# Patient Record
Sex: FEMALE | Race: WHITE | Employment: UNEMPLOYED | URBAN - METROPOLITAN AREA
[De-identification: names, ages, dates, MRNs, and addresses within clinical notes are randomized per-mention and may not be internally consistent; named-entity substitution may affect disease eponyms.]

---

## 2019-07-25 ENCOUNTER — APPOINTMENT (OUTPATIENT)
Dept: GENERAL RADIOLOGY | Facility: CLINIC | Age: 44
End: 2019-07-25

## 2019-07-25 ENCOUNTER — HOSPITAL ENCOUNTER (EMERGENCY)
Facility: CLINIC | Age: 44
Discharge: HOME | End: 2019-07-25
Attending: EMERGENCY MEDICINE | Admitting: EMERGENCY MEDICINE

## 2019-07-25 VITALS
RESPIRATION RATE: 16 BRPM | SYSTOLIC BLOOD PRESSURE: 96 MMHG | OXYGEN SATURATION: 100 % | HEIGHT: 67 IN | WEIGHT: 131.2 LBS | HEART RATE: 57 BPM | BODY MASS INDEX: 20.59 KG/M2 | TEMPERATURE: 98.3 F | DIASTOLIC BLOOD PRESSURE: 69 MMHG

## 2019-07-25 DIAGNOSIS — R04.2 HEMOPTYSIS: ICD-10-CM

## 2019-07-25 DIAGNOSIS — F17.210 CIGARETTE SMOKER: ICD-10-CM

## 2019-07-25 DIAGNOSIS — R05.9 COUGH: ICD-10-CM

## 2019-07-25 PROCEDURE — 99284 EMERGENCY DEPT VISIT MOD MDM: CPT | Mod: GC | Performed by: EMERGENCY MEDICINE

## 2019-07-25 PROCEDURE — 25000132 ZZH RX MED GY IP 250 OP 250 PS 637: Performed by: STUDENT IN AN ORGANIZED HEALTH CARE EDUCATION/TRAINING PROGRAM

## 2019-07-25 PROCEDURE — 71046 X-RAY EXAM CHEST 2 VIEWS: CPT

## 2019-07-25 PROCEDURE — 99283 EMERGENCY DEPT VISIT LOW MDM: CPT | Mod: 25

## 2019-07-25 RX ORDER — ALBUTEROL SULFATE 90 UG/1
2 AEROSOL, METERED RESPIRATORY (INHALATION) EVERY 4 HOURS PRN
Qty: 1 INHALER | Refills: 0 | Status: SHIPPED | OUTPATIENT
Start: 2019-07-25

## 2019-07-25 RX ORDER — IBUPROFEN 600 MG/1
600 TABLET, FILM COATED ORAL ONCE
Status: COMPLETED | OUTPATIENT
Start: 2019-07-25 | End: 2019-07-25

## 2019-07-25 RX ADMIN — IBUPROFEN 600 MG: 600 TABLET ORAL at 16:48

## 2019-07-25 ASSESSMENT — ENCOUNTER SYMPTOMS
ABDOMINAL PAIN: 0
FEVER: 0
VOMITING: 0
EYE REDNESS: 0
DIARRHEA: 0
DIZZINESS: 0
FREQUENCY: 0
COUGH: 1
BLOOD IN STOOL: 0
CHEST TIGHTNESS: 0
PHOTOPHOBIA: 0
NUMBNESS: 0
SLEEP DISTURBANCE: 0
STRIDOR: 0
HEMATOLOGIC/LYMPHATIC NEGATIVE: 1
SORE THROAT: 0
ENDOCRINE NEGATIVE: 1
FATIGUE: 0
SHORTNESS OF BREATH: 1
HEADACHES: 1
PALPITATIONS: 0
NAUSEA: 0
LIGHT-HEADEDNESS: 1
WEAKNESS: 0
RHINORRHEA: 0
CHILLS: 1
HEMATURIA: 0
WHEEZING: 1
CONSTIPATION: 0
EYE PAIN: 0
BACK PAIN: 0
DYSURIA: 0

## 2019-07-25 ASSESSMENT — MIFFLIN-ST. JEOR: SCORE: 1277.75

## 2019-07-25 NOTE — DISCHARGE INSTRUCTIONS
Please make an appointment to follow [facility] with [facility] (phone: [phone] or [facility] (phone: [phone]) in 3-7 days.    - Take inhaler as prescribed

## 2019-07-25 NOTE — ED AVS SNAPSHOT
[facility], Emergency Department  [address]  [facility] [address]  Phone:  [phone]  Fax:  [phone]                                    Viridiana Peralta   MRN: [id number]    Department:  [facility], Emergency Department   Date of Visit:  7/25/2019           After Visit Summary Signature Page    I have received my discharge instructions, and my questions have been answered. I have discussed any challenges I see with this plan with the nurse or doctor.    ..........................................................................................................................................  Patient/Patient Representative Signature      ..........................................................................................................................................  Patient Representative Print Name and Relationship to Patient    ..................................................               ................................................  Date                                   Time    ..........................................................................................................................................  Reviewed by Signature/Title    ...................................................              ..............................................  Date                                               Time          22EPIC Rev 08/18

## 2019-07-25 NOTE — ED PROVIDER NOTES
History     Chief Complaint   Patient presents with     Hemoptysis     Coughing up blood for the last two weeks. Sometimes sputum will not come up. Feels tightness in chest when laying down.      Eye Problem     Blurry vision for the last month.      Headache     Headache started about a month ago. Right sided back of headache now.      HPI  Viridiana Peralta is a 44 year old female who presents here with hemopytsis, eye problem, and headache.  Jennifer spitting up black stuff or black stuff, headache and vision problems.  Sometimes vision elke like it has a film on it or seeing starts, L eye more than R.     Coughing up stuff for 2-3 weeks.  Started off like regular sputum, green and black.  Started getting worse, started off pink and then got darker. Feels like something in the middle of chest and hard to breath when laying down. Coughs, but still feels like something there. No fevers, but chills. Nothing like this before.  No running nose, sneezing, or postnasal drip.  Smoker, currently 1/day for 10-15 years.  Wants to quick and will try anything.  Some shortness of breath, cant catch in. No trouble breathing. No PND.     Eye vision has been different last couple months. Mainly blurry over time. Blurry with things that are near and not far.  Sees floaters sometimes. No redness or painful.     Headache on R side goes front to back.  Gets headaches since head injuries in the past (last injury over 20 years ago).  Dull, comes and goes, 7/10.  Hasn't tried anything for it. Feels a little different from other headaches. Supposed to have head scans every 6 months to check for clots, but hasn't been. No recent head trauma. No changes in hearing.     Drinks alcohol now and then. Last drinl 3-4 ngihts ago.  Smokes marijuana, no iv drug use.     History reviewed. No pertinent past medical history.    Social History     Socioeconomic History     Marital status: Single     Spouse name: Not on file     Number of children: Not  on file     Years of education: Not on file     Highest education level: Not on file   Occupational History     Not on file   Social Needs     Financial resource strain: Not on file     Food insecurity:     Worry: Not on file     Inability: Not on file     Transportation needs:     Medical: Not on file     Non-medical: Not on file   Tobacco Use     Smoking status: Current Every Day Smoker     Packs/day: 0.50     Smokeless tobacco: Never Used   Substance and Sexual Activity     Alcohol use: Yes     Comment: Occssioanl      Drug use: Yes     Types: Marijuana     Sexual activity: Not Currently   Lifestyle     Physical activity:     Days per week: Not on file     Minutes per session: Not on file     Stress: Not on file   Relationships     Social connections:     Talks on phone: Not on file     Gets together: Not on file     Attends Spiritism service: Not on file     Active member of club or organization: Not on file     Attends meetings of clubs or organizations: Not on file     Relationship status: Not on file     Intimate partner violence:     Fear of current or ex partner: Not on file     Emotionally abused: Not on file     Physically abused: Not on file     Forced sexual activity: Not on file   Other Topics Concern     Not on file   Social History Narrative     Not on file       I have reviewed the Medications, Allergies, Past Medical and Surgical History, and Social History in the Epic system.    Review of Systems   Constitutional: Positive for chills. Negative for fatigue and fever.   HENT: Negative for congestion, drooling, hearing loss, postnasal drip, rhinorrhea and sore throat.    Eyes: Positive for visual disturbance. Negative for photophobia, pain and redness.   Respiratory: Positive for cough, shortness of breath and wheezing. Negative for chest tightness and stridor.    Cardiovascular: Negative for chest pain, palpitations and leg swelling.   Gastrointestinal: Negative for abdominal pain, blood in stool,  "constipation, diarrhea, nausea and vomiting.   Endocrine: Negative.    Genitourinary: Negative for dysuria, frequency, hematuria and urgency.   Musculoskeletal: Negative for back pain.   Skin: Negative for rash.   Neurological: Positive for light-headedness and headaches. Negative for dizziness, syncope, weakness and numbness.   Hematological: Negative.    Psychiatric/Behavioral: Negative for sleep disturbance.       Physical Exam   BP: 115/56  Pulse: 57  Heart Rate: 92  Temp: 98.3  F (36.8  C)  Resp: 16  Height: 170.2 cm (5' 7\")  Weight: 59.5 kg (131 lb 3.2 oz)  SpO2: 99 %      Physical Exam   Constitutional: She is oriented to person, place, and time. She appears well-developed and well-nourished.   HENT:   Head: Normocephalic and atraumatic.   Eyes: Pupils are equal, round, and reactive to light. Conjunctivae and EOM are normal. No scleral icterus.   Cardiovascular: Normal rate, regular rhythm, normal heart sounds and intact distal pulses.   Pulmonary/Chest: Effort normal and breath sounds normal. She has no wheezes.   Abdominal: Soft. Bowel sounds are normal. She exhibits no distension. There is no tenderness. There is no guarding.   Neurological: She is alert and oriented to person, place, and time.   Skin: Skin is warm and dry.   Psychiatric: She has a normal mood and affect. Her behavior is normal.   Nursing note and vitals reviewed.      ED Course        Procedures          Critical Care time:  none  Results for orders placed or performed during the hospital encounter of 07/25/19   XR Chest 2 Views    Narrative    Examination:  XR CHEST 2 VW    Date:  7/25/2019 5:01 PM     Clinical Information: Hemoptysis     Additional Information: none    Comparison: 1/2/2013.    Findings:     Clear lungs without infiltrate, edema, or pleural effusion. Normal  heart size and silhouette. Normal pulmonary vasculature. No  significant osseous abnormality.      Impression    Impression:    1. Normal chest.    MEÑO LYNN" MD JUAN F              Assessments & Plan (with Medical Decision Making)   Viridiana Peralta is a 44 year old female who presents here with hemopytsis, eye problem, and headache. Given history, exam, and xray patient most like is coughing up old blood from irritation and coughing from smoking.  Pt needs to see an optometrist for her eyes, most like due to aging.  Headache appears to be a tension headache.  DDx: Pneumonia, TB, Cancer, SCC of throat    1. Hemoptysis  - Ordered a CXR to rule out cancer or pneumonia: came back normal  - Discharged patient with albuterol and advised to quit smoking  - Patient to follow up with a PCP in 3-5 days     Dispo: I recommend discharging the patient as she is stable and does not appear to have an infection or cancer.    I have discussed with the patient that if coughing or hemoptysis becomes severe to return or vision loss. Otherwise to find a PCP in next 3-5 days for follow up.    I have reviewed the nursing notes.  This data collected with the Resident working in the Emergency Department.  Patient was seen and evaluated by myself and I repeated the history and physical exam with the patient.  The plan of care was discussed with them.  The key portions of the note including the entire assessment and plan reflect my documentation.  On my exam her lungs are clear her chest x-ray is unremarkable heart is regular is in no distress she is not hypoxic or febrile.  Agree with the plan as reflected above.  Follow-up with primary care provider.      I have reviewed the findings, diagnosis, plan and need for follow up with the patient.       Medication List      Started    albuterol 108 (90 Base) MCG/ACT inhaler  Commonly known as:  PROAIR HFA  2 puffs, Inhalation, EVERY 4 HOURS PRN            Final diagnoses:   Hemoptysis   Cough     Milvia Johnston MD  PGY1 Family Medicine  7/25/2019   [facility], [place], EMERGENCY DEPARTMENT     Sushil Velazquez MD  07/30/19 [id number]

## 2019-07-25 NOTE — ED TRIAGE NOTES
Coughing up blood for the last two weeks. Sometimes sputum will not come up. Feels tightness in chest when laying down. Blurry vision for the last month. Headache started about a month ago. Right sided back of headache now.

## 2020-10-13 ENCOUNTER — APPOINTMENT (OUTPATIENT)
Dept: CT IMAGING | Facility: CLINIC | Age: 45
End: 2020-10-13
Attending: EMERGENCY MEDICINE

## 2020-10-13 ENCOUNTER — HOSPITAL ENCOUNTER (EMERGENCY)
Facility: CLINIC | Age: 45
Discharge: HOME | End: 2020-10-14
Attending: EMERGENCY MEDICINE | Admitting: EMERGENCY MEDICINE

## 2020-10-13 DIAGNOSIS — S30.1XXA CONTUSION OF ABDOMINAL WALL, INITIAL ENCOUNTER: ICD-10-CM
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
## 2020-10-13 LAB
ALBUMIN SERPL-MCNC: 3.7 G/DL (ref 3.4–5)
ALP SERPL-CCNC: 80 U/L (ref 40–150)
ALT SERPL W P-5'-P-CCNC: 20 U/L (ref 0–50)
ANION GAP SERPL CALCULATED.3IONS-SCNC: 4 MMOL/L (ref 3–14)
AST SERPL W P-5'-P-CCNC: 16 U/L (ref 0–45)
BASOPHILS # BLD AUTO: 0.1 10E9/L (ref 0–0.2)
BASOPHILS NFR BLD AUTO: 0.8 %
BILIRUB SERPL-MCNC: 0.5 MG/DL (ref 0.2–1.3)
BUN SERPL-MCNC: 14 MG/DL (ref 7–30)
CALCIUM SERPL-MCNC: 8.6 MG/DL (ref 8.5–10.1)
CHLORIDE SERPL-SCNC: 107 MMOL/L (ref 94–109)
CO2 SERPL-SCNC: 29 MMOL/L (ref 20–32)
CREAT SERPL-MCNC: 0.93 MG/DL (ref 0.52–1.04)
DIFFERENTIAL METHOD BLD: ABNORMAL
EOSINOPHIL # BLD AUTO: 0.2 10E9/L (ref 0–0.7)
EOSINOPHIL NFR BLD AUTO: 2.3 %
ERYTHROCYTE [DISTWIDTH] IN BLOOD BY AUTOMATED COUNT: 17.1 % (ref 10–15)
GFR SERPL CREATININE-BSD FRML MDRD: 74 ML/MIN/{1.73_M2}
GLUCOSE SERPL-MCNC: 89 MG/DL (ref 70–99)
HCG UR QL: NEGATIVE
HCT VFR BLD AUTO: 43.5 % (ref 35–47)
HGB BLD-MCNC: 14.1 G/DL (ref 11.7–15.7)
IMM GRANULOCYTES # BLD: 0 10E9/L (ref 0–0.4)
IMM GRANULOCYTES NFR BLD: 0.3 %
INTERNAL QC OK POCT: YES
LYMPHOCYTES # BLD AUTO: 2.2 10E9/L (ref 0.8–5.3)
LYMPHOCYTES NFR BLD AUTO: 27.5 %
MCH RBC QN AUTO: 28.7 PG (ref 26.5–33)
MCHC RBC AUTO-ENTMCNC: 32.4 G/DL (ref 31.5–36.5)
MCV RBC AUTO: 89 FL (ref 78–100)
MONOCYTES # BLD AUTO: 0.5 10E9/L (ref 0–1.3)
MONOCYTES NFR BLD AUTO: 6.6 %
NEUTROPHILS # BLD AUTO: 4.9 10E9/L (ref 1.6–8.3)
NEUTROPHILS NFR BLD AUTO: 62.5 %
NRBC # BLD AUTO: 0 10*3/UL
NRBC BLD AUTO-RTO: 0 /100
PLATELET # BLD AUTO: 305 10E9/L (ref 150–450)
POTASSIUM SERPL-SCNC: 3.4 MMOL/L (ref 3.4–5.3)
PROT SERPL-MCNC: 8.1 G/DL (ref 6.8–8.8)
RBC # BLD AUTO: 4.91 10E12/L (ref 3.8–5.2)
SODIUM SERPL-SCNC: 140 MMOL/L (ref 133–144)
WBC # BLD AUTO: 7.9 10E9/L (ref 4–11)

## 2020-10-13 PROCEDURE — 74177 CT ABD & PELVIS W/CONTRAST: CPT

## 2020-10-13 PROCEDURE — 99284 EMERGENCY DEPT VISIT MOD MDM: CPT | Performed by: EMERGENCY MEDICINE

## 2020-10-13 PROCEDURE — 81025 URINE PREGNANCY TEST: CPT | Performed by: EMERGENCY MEDICINE

## 2020-10-13 PROCEDURE — 72125 CT NECK SPINE W/O DYE: CPT

## 2020-10-13 PROCEDURE — 85025 COMPLETE CBC W/AUTO DIFF WBC: CPT | Performed by: EMERGENCY MEDICINE

## 2020-10-13 PROCEDURE — 70450 CT HEAD/BRAIN W/O DYE: CPT

## 2020-10-13 PROCEDURE — 80053 COMPREHEN METABOLIC PANEL: CPT | Performed by: EMERGENCY MEDICINE

## 2020-10-13 PROCEDURE — 99285 EMERGENCY DEPT VISIT HI MDM: CPT | Mod: 25

## 2020-10-13 RX ORDER — IOPAMIDOL 755 MG/ML
100 INJECTION, SOLUTION INTRAVASCULAR ONCE
Status: COMPLETED | OUTPATIENT
Start: 2020-10-13 | End: 2020-10-14

## 2020-10-13 ASSESSMENT — ENCOUNTER SYMPTOMS
DYSURIA: 0
BACK PAIN: 1
VOMITING: 0
FREQUENCY: 0
BLOOD IN STOOL: 0
HEMATURIA: 0
ABDOMINAL PAIN: 1
DIARRHEA: 0
HEADACHES: 0

## 2020-10-13 NOTE — ED AVS SNAPSHOT
[facility] Emergency Department  [address]  [place] [address]  Phone: [phone]  Fax: [phone]                                    Viridiana Peralta   MRN: [id number]    Department: [facility] Emergency Department   Date of Visit: 10/13/2020           After Visit Summary Signature Page    I have received my discharge instructions, and my questions have been answered. I have discussed any challenges I see with this plan with the nurse or doctor.    ..........................................................................................................................................  Patient/Patient Representative Signature      ..........................................................................................................................................  Patient Representative Print Name and Relationship to Patient    ..................................................               ................................................  Date                                   Time    ..........................................................................................................................................  Reviewed by Signature/Title    ...................................................              ..............................................  Date                                               Time          22EPIC Rev 08/18

## 2020-10-14 VITALS
DIASTOLIC BLOOD PRESSURE: 59 MMHG | SYSTOLIC BLOOD PRESSURE: 114 MMHG | OXYGEN SATURATION: 99 % | WEIGHT: 129.4 LBS | HEART RATE: 78 BPM | BODY MASS INDEX: 20.27 KG/M2 | TEMPERATURE: 98.9 F | RESPIRATION RATE: 16 BRPM

## 2020-10-14 PROCEDURE — 250N000009 HC RX 250: Performed by: EMERGENCY MEDICINE

## 2020-10-14 PROCEDURE — 250N000011 HC RX IP 250 OP 636: Performed by: EMERGENCY MEDICINE

## 2020-10-14 RX ADMIN — IOPAMIDOL 64 ML: 755 INJECTION, SOLUTION INTRAVENOUS at 00:04

## 2020-10-14 RX ADMIN — SODIUM CHLORIDE 56 ML: 9 INJECTION, SOLUTION INTRAVENOUS at 00:07
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
## 2020-10-14 NOTE — DISCHARGE INSTRUCTIONS
Please make an appointment to [facility] with [facility] (phone: [phone]) in 7-10 days if not improving.    Use acetaminophen 1000 mg every 6 hours as needed for pain.  Alternate heat and ice in the areas of discomfort.      If you have any worsening pain, intractable vomiting or other concerns, return to the emergency department for re-evaluation.